# Patient Record
Sex: FEMALE | Race: WHITE
[De-identification: names, ages, dates, MRNs, and addresses within clinical notes are randomized per-mention and may not be internally consistent; named-entity substitution may affect disease eponyms.]

---

## 2019-07-26 ENCOUNTER — HOSPITAL ENCOUNTER (EMERGENCY)
Dept: HOSPITAL 88 - ER | Age: 49
LOS: 1 days | Discharge: HOME | End: 2019-07-27
Payer: COMMERCIAL

## 2019-07-26 VITALS — BODY MASS INDEX: 21.05 KG/M2 | HEIGHT: 66 IN | WEIGHT: 131 LBS

## 2019-07-26 DIAGNOSIS — F15.10: Primary | ICD-10-CM

## 2019-07-26 PROCEDURE — 85025 COMPLETE CBC W/AUTO DIFF WBC: CPT

## 2019-07-26 PROCEDURE — 80048 BASIC METABOLIC PNL TOTAL CA: CPT

## 2019-07-26 PROCEDURE — 36415 COLL VENOUS BLD VENIPUNCTURE: CPT

## 2019-07-26 PROCEDURE — 84484 ASSAY OF TROPONIN QUANT: CPT

## 2019-07-26 PROCEDURE — 82550 ASSAY OF CK (CPK): CPT

## 2019-07-26 PROCEDURE — 93005 ELECTROCARDIOGRAM TRACING: CPT

## 2019-07-26 PROCEDURE — 82553 CREATINE MB FRACTION: CPT

## 2019-07-26 PROCEDURE — 99283 EMERGENCY DEPT VISIT LOW MDM: CPT

## 2019-07-26 NOTE — XMS REPORT
Summary of Care: 18 - 18

                             Created on: 10/08/2100



MOLLY MCLAIN

External Reference #: [id number]

: 1970

Sex: Female



Demographics







                          Address                   [address] [place]  [address]

 

                          Home Phone                [phone]

 

                          Preferred Language        English

 

                          Marital Status            

 

                          Scientologist Affiliation     [employer]

 

                          Race                      White

 

                          Additional Race(s)        White



 

                          Ethnic Group              Non-





Author







                          Author                    [facility]

 

                          Organization              [facility]

 

                          Address                   Unknown

 

                          Phone                     Unavailable







Encounter





[facility] [id number] Date(s): 18 - 18

[facility] [address] [place], [place] [address] [phone]

Discharge Diagnosis: Sacrococcygeal disorders, not elsewhere classified

Discharge Disposition: Discharged to Home or Self Care

Attending Physician: Bennett Hernandez MD

Admitting Physician: Bennett Hernandez MD





Vital Signs







                    1                   2                   3



                                         Most recent to   



                                         oldest [Reference   



                                         Range]:   

 

                                        36.0 DegC 

*LOW*

                          (18 2:25 PM)         37.0 DegC 

                          (18 12:25 PM)         



                                         Temperature Temporal   



                                         Artery [36.3-37.8   



                                         DegC]   

 

                                        96.8 

                    (18 2:25 PM)                         



                                         Temperature Temporal   



                                         Fahrenheit   

 

                                        68 bpm 

                          (18 2:40 PM)         68 bpm 

                          (18 2:35 PM)         70 bpm 

                                        (18 2:30 PM)



                                         Peripheral Pulse   



                                         Rate [ bpm]   

 

                                        16 

                          (18 2:40 PM)         16 

                          (18 2:35 PM)         16 

                                        (18 2:30 PM)



                                         Respiratory Rate   



                                         [12-20]   

 

                                        98 % 

                          (18 2:40 PM)         98 % 

                          (18 2:35 PM)         98 % 

                                        (18 2:30 PM)



                                         SpO2 [ %]   

 

                                        105/58 mmHg 

*LOW*

                          (18 2:40 PM)         110/59 mmHg 

                          (18 2:35 PM)         93/55 mmHg 

*LOW*

                                        (18 2:30 PM)



                                         Blood Pressure   



                                         [110-120/65-85 mmHg]   

 

                                        73.7 mmHg 

                          (18 2:40 PM)         76 mmHg 

                          (18 2:35 PM)         67.7 mmHg 

                                        (18 2:30 PM)



                                         Mean Arterial   



                                         Pressure, Cuff   

 

                                        167 cm 

                    (18 12:25 PM)                         



                                         Height   

 

                                        167 cm 

                    (18 12:25 PM)                         



                                         Height/Length Dosing   

 

                                        66 in 

                    (18 12:25 PM)                         



                                         Height Inches   

 

                                        62.1 kg 

                    (18 12:25 PM)                         



                                         Weight   

 

                                        62.1 kg 

                    (18 12:25 PM)                         



                                         Weight Dosing   

 

                                        137 lb 

                    (18 12:25 PM)                         



                                         Weight Pounds   

 

                                        22.27 kg/m2 

                    (18 12:25 PM)                         



                                         Body Mass Index   







Problem List







    



              Condition     Effective Dates     Status       Health Status     Informant

 

    



                           Back pain(Confirmed)      Active  







Allergies, Adverse Reactions, Alerts





No Known Allergies



Medications





ceFAZolin

1 gm, Powder-Inj, IV, Once, first dose 18 14:09:00 CDT, stop date 18
14:09:00 CDT

Start Date: 18

Stop Date: 18

Status: Completed



ceFAZolin

1 gm, Soln-IV, IV Piggyback, Once, infuse over 30 minutes, first dose 18 1
3:00:00 CDT, stop date 18 13:00:00 CDT, Prophylaxis

Start Date: 18

Stop Date: 18

Status: Completed



gabapentin 300 mg oral capsule

mg caps, Oral, TID, 0 Refill(s)

Start Date: 18

Stop Date: 8/3/18

Status: Ordered



ibuprofen 800 mg oral tablet

800 mg=1 tabs, Oral, BID, # 90 tabs, 0 Refill(s)

Start Date: 12/21/15

Stop Date: 16

Status: Ordered



lidocaine

2 mL, Injection, IV, Once, first dose 18 14:08:00 CDT, stop date 18 
14:08:00 CDT

Start Date: 18

Stop Date: 18

Status: Completed



LR 1,000 mL

1,000 mL, IV, 100 mL/hr, start date 18 12:09:00 CDT, For Adults

Start Date: 18

Stop Date: 18

Status: Discontinued



 mL

500 mL, IV, 75 mL/hr, start date 18 12:09:00 CDT

Start Date: 18

Stop Date: 18

Status: Discontinued



Lyrica 75 mg oral capsule

75 mg=1 caps, Oral, BID, 0 Refill(s)

Start Date: 12/21/15

Stop Date: 18

Status: Discontinued



Misc Medication

300 mL, Soln-IV, IV, Once, first dose 18 14:21:00 CDT, stop date 18 
14:21:00 CDT

Start Date: 18

Stop Date: 18

Status: Completed



Norco 5 mg-325 mg oral tablet

tabs, Oral, q6hr, 0 Refill(s)

Start Date: 18

Stop Date: 8/3/18

Status: Ordered



Pain Ease topical spray

1 sprays, Spray, TOP, Once, first dose 18 13:00:00 CDT, stop date 18
13:00:00 CDT, Apply topically for 4  - 10 seconds from a distance of 3  - 7 inc
hes from the procedure site

Start Date: 18

Stop Date: 18

Status: Completed



propofol

50 mg=5 mL, Emulsion, IV, Once, first dose 18 14:17:00 CDT, stop date  14:17:00 CDT

Start Date: 18

Stop Date: 18

Status: Completed



propofol

50 mg=5 mL, Emulsion, IV, Once, first dose 18 14:13:00 CDT, stop date  14:13:00 CDT

Start Date: 18

Stop Date: 18

Status: Completed



propofol

50 mg=5 mL, Emulsion, IV, Once, first dose 18 14:09:00 CDT, stop date  14:09:00 CDT

Start Date: 18

Stop Date: 18

Status: Completed



Zipsor 25 mg oral capsule

25 mg=1 caps, Oral, QID, PRN for pain, # 40 caps, 0 Refill(s)

Start Date: 12/21/15

Stop Date: 16

Status: Ordered



Results





LABORATORY





 



                           Most recent to            1



                                         oldest [Reference 



                                         Range]: 

 

 



                           urine beta hcg            Negative, Control Present



                                         (18 12:48 PM)







Immunizations





No data available for this section



Procedures





No data available for this section



Social History







 



                           Social History Type       Response







Assessment and Plan





No data available for this section

## 2019-07-26 NOTE — XMS REPORT
Continuity of Care Document

                             Created on: 2019



MOLLY MCLAIN

External Reference #: [id number]

: 1970

Sex: Female



Demographics







                          Address                   [address] [place]  [address]

 

                          Home Phone                [phone]

 

                          Preferred Language        English

 

                          Marital Status            Unknown

 

                          Jew Affiliation     Unknown

 

                          Race                      Unknown

 

                          Ethnic Group              Unknown





Author







                          Author                    [facility]

 

                          Address                   Unknown

 

                          Phone                     Unavailable







Care Team Providers







                    Care Team Member Name    Role                Phone

 

                    [facility] [facility] [employer] [facility]    Unavailable         Unavailable



                                    



Problems

                    





                    Problem                            Status                            Onset Date     

                          Classification                            Date Reported       

                          Comments                            Source                    

 

                                        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

                                                        2018                                        

                                        [facility]                    

 

                                        Discharge Diagnosis: Sacrococcygeal disorders, not elsewhere classified         

                                                2018                                                 

                                        [facility]                    

 

                    Back pain                            Active                                         

                          Problem                            10/28/2018                         

                                                      [facility]                    





                                                                                
                       



Medications

                    





                    Medication                            Details                            Route      

                          Status                            Patient Instructions         

                          Ordering Provider                            Order Date           

                                        Source                    

 

                          Misc Medication                            300 mL, Soln-IV, IV, Once, first dose 10/26/18

 9:31:00 CDT, stop date 10/26/18 9:31:00 CDT                                       

                    Inactive                                                              

                          10/26/2018                            [facility]                    

 

                          propofol                            50 mg=5 mL, Emulsion, IV, Once, first dose 10/26/18

 9:27:00 CDT, stop date 10/26/18 9:27:00 CDT                                       

                    Inactive                                                              

                          10/26/2018                            [facility]                    

 

                          propofol                            50 mg=5 mL, Emulsion, IV, Once, first dose 10/26/18

 9:25:00 CDT, stop date 10/26/18 9:25:00 CDT                                       

                    Inactive                                                              

                          10/26/2018                            [facility]                    

 

                          propofol                            50 mg=5 mL, Emulsion, IV, Once, first dose 10/26/18

 9:22:00 CDT, stop date 10/26/18 9:22:00 CDT                                       

                    Inactive                                                              

                          10/26/2018                            [facility]                    

 

                          propofol                            50 mg=5 mL, Emulsion, IV, Once, first dose 10/26/18

 9:19:00 CDT, stop date 10/26/18 9:19:00 CDT                                       

                    Inactive                                                              

                          10/26/2018                            [facility]                    

 

                          ceFAZolin                            1 gm, Powder-Inj, IV, Once, first dose 10/26/18

 9:19:00 CDT, stop date 10/26/18 9:19:00 CDT                                       

                    Inactive                                                              

                          10/26/2018                            [facility]                    

 

                          lidocaine                            2 mL, Injection, IV, Once, first dose 10/26/18

 9:17:00 CDT, stop date 10/26/18 9:17:00 CDT                                       

                    Inactive                                                              

                          10/26/2018                            [facility]                    

 

                          Cefazolin                            1 gm, Soln-IV, IV Piggyback, Once, infuse over

 30 minutes, first dose 10/26/18 8:00:00 CDT, stop date 10/26/18 8:00:00 CDT, 
Prophylaxis                                                        Inactive          

                                                                            10/26/2018   

                                        [facility]                    

 

                          Pain Ease topical spray                            1 sprays, Spray, TOP, Once, first

 dose 10/26/18 8:00:00 CDT, stop date 10/26/18 8:00:00 CDT, Apply topically for 
4  - 10 seconds from a distance of 3  - 7 inches from the procedure site        
                                                Inactive                                

                                                      10/26/2018                       

                                        [facility]                    

 

                           mL                            500 mL, IV, 75 mL/hr, start date 10/26/18 7:57:00

 CDT                                                        Inactive                 

                                                                            10/26/2018          

                                        [facility]                    

 

                          Misc Medication                            200 mL, Soln-IV, IV, Once, first dose 18

 9:20:00 CDT, stop date 18 9:20:00 CDT                                       

                    Inactive                                                              

                          2018                            [facility]                    

 

                          propofol                            50 mg=5 mL, Emulsion, IV, Once, first dose 18

 9:14:00 CDT, stop date 18 9:14:00 CDT                                       

                    Inactive                                                              

                          2018                            [facility]                    

 

                          propofol                            50 mg=5 mL, Emulsion, IV, Once, first dose 18

 9:11:00 CDT, stop date 18 9:11:00 CDT                                       

                    Inactive                                                              

                          2018                            [facility]                    

 

                          propofol                            50 mg=5 mL, Emulsion, IV, Once, first dose 18

 9:08:00 CDT, stop date 18 9:08:00 CDT                                       

                    Inactive                                                              

                          2018                            [facility]                    

 

                          propofol                            50 mg=5 mL, Emulsion, IV, Once, first dose 18

 9:06:00 CDT, stop date 18 9:06:00 CDT                                       

                    Inactive                                                              

                          2018                            [facility]                    

 

                          ceFAZolin                            1 gm, Powder-Inj, IV, Once, first dose 18

 9:03:00 CDT, stop date 18 9:03:00 CDT                                       

                    Inactive                                                              

                          2018                            [facility]                    

 

                          propofol                            50 mg=5 mL, Emulsion, IV, Once, first dose 18

 9:03:00 CDT, stop date 18 9:03:00 CDT                                       

                    Inactive                                                              

                          2018                            [facility]                    

 

                          lidocaine                            2 mL, Injection, IV, Once, first dose 18

 9:02:00 CDT, stop date 18 9:02:00 CDT                                       

                    Inactive                                                              

                          2018                            [facility]                    

 

                          Cefazolin                            1 gm, Soln-IV, IV Piggyback, Once, infuse over

 30 minutes, first dose 18 9:00:00 CDT, stop date 18 9:00:00 CDT, 
Prophylaxis                                                        Inactive          

                                                                            2018   

                                        [facility]                    

 

                           mL                            500 mL, IV, 75 mL/hr, start date 18 8:02:00

 CDT                                                        Inactive                 

                                                                            2018          

                                        [facility]                    

 

                          Misc Medication                            300 mL, Soln-IV, IV, Once, first dose 18

 14:21:00 CDT, stop date 18 14:21:00 CDT                                     

                    Inactive                                                            

                          2018                            [facility]                   

 

 

                          propofol                            50 mg=5 mL, Emulsion, IV, Once, first dose 18

 14:17:00 CDT, stop date 18 14:17:00 CDT                                     

                    Inactive                                                            

                          2018                            [facility]                   

 

 

                          propofol                            50 mg=5 mL, Emulsion, IV, Once, first dose 18

 14:13:00 CDT, stop date 18 14:13:00 CDT                                     

                    Inactive                                                            

                          2018                            [facility]                   

 

 

                          propofol                            50 mg=5 mL, Emulsion, IV, Once, first dose 18

 14:09:00 CDT, stop date 18 14:09:00 CDT                                     

                    Inactive                                                            

                          2018                            [facility]                   

 

 

                          ceFAZolin                            1 gm, Powder-Inj, IV, Once, first dose 18

 14:09:00 CDT, stop date 18 14:09:00 CDT                                     

                    Inactive                                                            

                          2018                            [facility]                   

 

 

                          lidocaine                            2 mL, Injection, IV, Once, first dose 18

 14:08:00 CDT, stop date 18 14:08:00 CDT                                     

                    Inactive                                                            

                          2018                            [facility]                   

 

 

                          Pain Ease topical spray                            1 sprays, Spray, TOP, Once, first

 dose 18 13:00:00 CDT, stop date 18 13:00:00 CDT, Apply topically 
for 4  - 10 seconds from a distance of 3  - 7 inches from the procedure site    
                                                   Inactive                            

                                                        2018                   

                                        [facility]                    

 

                          Cefazolin                            1 gm, Soln-IV, IV Piggyback, Once, infuse over

 30 minutes, first dose 18 13:00:00 CDT, stop date 18 13:00:00 CDT, 
Prophylaxis                                                        Inactive          

                                                                            2018   

                                        [facility]                    

 

                                        Acetaminophen 325 MG / Hydrocodone Bitartrate 5 MG Oral Tablet [Norco 5/325]    

                          tabs, Oral, q6hr, 0 Refill(s)                                

                        Active                                                         

                           2018                            [facility]                

    

 

                          gabapentin 300 MG Oral Capsule                            mg caps, Oral, TID, 0 Refill(s)

                                                        Active                       

                                                                            2018                

                                        [facility]                    

 

                          LR 1,000 mL                            1,000 mL, IV, 100 mL/hr, start date 18

 12:09:00 CDT, For Adults                                                        Inactive

                                                                                    2018

                                        [facility]                    

 

                           mL                            500 mL, IV, 75 mL/hr, start date 18 12:09:00

 CDT                                                        Inactive                 

                                                                            2018          

                                        [facility]                    

 

                          Valium                            10 mg=2 mL, Injection, IV Push, Once, first dose

 12/22/15 13:00:00 CST, stop date 12/22/15 13:00:00 CST                            

                            Inactive                                                 

                    [place]ore[place]                            2015                            [facility] Med[facility]                            300 mL, Soln-IV, IV, Once, first dose 12/22/15

 11:31:00 CST, stop date 12/22/15 11:31:00 CST                                     

                    Inactive                                                        [facility]

                            2015                            [facility]                    

 

                          propofol                            50 mg=5 mL, Emulsion, IV, Once, first dose 12/22/15

 11:25:00 CST, stop date 12/22/15 11:25:00 CST                                     

                    Inactive                                                        [facility]

                            2015                            [facility]                    

 

                          propofol                            50 mg=5 mL, Emulsion, IV, Once, first dose 12/22/15

 11:23:00 CST, stop date 12/22/15 11:23:00 CST                                     

                    Inactive                                                        [facility]

                            2015                            [facility]                    

 

                          midazolam                            2 mg=2 mL, Injection, IV, Once, first dose 12/22/15

 11:23:00 CST, stop date 12/22/15 11:23:00 CST                                     

                    Inactive                                                        [facility]

                            2015                            [facility]                    

 

                          midazolam                            2 mg=2 mL, Injection, IV, Once, first dose 12/22/15

 11:19:00 CST, stop date 12/22/15 11:19:00 CST                                     

                    Inactive                                                        [facility]

                            2015                            [facility]                    

 

                          lidocaine                            2 mL, Injection, IV, Once, first dose 12/22/15

 11:19:00 CST, stop date 12/22/15 11:19:00 CST                                     

                    Inactive                                                        [facility]

                            2015                            [facility]                    

 

                          ethyl chloride topical spray                            1 sprays, Spray, TOP, Once,

 first dose 12/22/15 11:00:00 CST, stop date 12/22/15 11:00:00 CST              
                                                Inactive                                      

                          Lorena                            2015                       

                                        [facility]                    

 

                           mL                            500 mL, IV, 75 mL/hr, start date 12/22/15 10:41:00

 CST                                                        Inactive                 

                                                [place]na                            2015    

                                        [facility]                    

 

                          Ibuprofen 800 MG Oral Tablet                            800 mg=1 tabs, Oral, BID, 

# 90 tabs, 0 Refill(s)                                                        Active 

                                                                                   2015

                                        [facility]                    

 

                          ibuprofen 800 mg oral tablet                            800 mg=1 tabs, Oral, BID, 

# 90 tabs, 0 Refill(s)                                                        Active 

                                                                                   2015

                                        [facility]                    

 

                          Diclofenac Potassium 25 MG Oral Capsule [Zipsor]                            25 mg=1

 caps, Oral, QID, PRN for pain, # 40 caps, 0 Refill(s)                             

                           No Longer Active                                          

                                                2015                            [facility]   

                 

 

                          Zipsor 25 mg oral capsule                            25 mg=1 caps, Oral, QID, PRN 

for pain, # 40 caps, 0 Refill(s)                                                   

                    Active                                                                            

                          2015                            [facility]          

          

 

                          pregabalin 75 MG Oral Capsule [Lyrica]                            75 mg=1 caps, Oral,

 BID, 0 Refill(s)                                                        No Longer Active

                                                                                    2015

                                        [facility]                    

 

                          Lyrica 75 mg oral capsule                            75 mg=1 caps, Oral, BID, 0 Refill(s)

                                                        Active                       

                                                                            2015                

                                        [facility]                    



                                                                                
                                                                                
                                                                                
                                                                                
                                                                                
                                                                                
                                                                                
                                                                                
                                                                                
                                                                                
                    



Allergies, Adverse Reactions, Alerts

        





                                        No Known Medication Allergies                      



                                                        



Immunizations

        





                                        No Data Provided for This Section



                                    



Results







                    Order Name                            Results                            Value      

                          Reference Range                            Date                

                          Interpretation                            Comments                       

                                        Source                    

 

                    LABORATORY                            urine beta hcg      Negative, Control Present 

                    (18 8:36 AM)                                                        2018 

                                                                                   [facility]

                    

 

                    LABORATORY                            urine beta hcg      Negative, Control Present 

                    (18 12:48 PM)                                                        2018

                                                                                    [facility]

                    



                              



Pathology Reports







                                        No Data Provided for This Section                    



                            



Diagnostic Reports

            





                                        No Data Provided for This Section                    



                                                            



Consultation Notes

                    





                                        No Data Provided for This Section                    



                                                            



Discharge Summaries

                    





                                        No Data Provided for This Section                    



                                                            



History and Physicals

                    





                                        No Data Provided for This Section                    



                                                                



Vital Signs

                     





                    Vital Sign                            Value                            Date         

                          Comments                            Source                    

 

                    Systolic (mm Hg)                            100                             10/26/2018

                                                        [facility]                    

 

                    Diastolic (mm Hg)                            56                             10/26/2018

                                                        [facility]                    

 

                    Respitory Rate                            16                             10/26/2018 

                                                       [facility]                    

 

                    Heart Rate                            81                             10/26/2018     

                                                      [facility]                    

 

                    Temperature Oral (F)                            36.1 Luda                            

10/26/2018                                                        [facility]               

     

 

                    Heart Rate                            69                             10/26/2018     

                                                      [facility]                    

 

                    Systolic (mm Hg)                            92                             10/26/2018

                                                        [facility]                    

 

                    Diastolic (mm Hg)                            52                             10/26/2018

                                                        [facility]                    

 

                    Respitory Rate                            16                             10/26/2018 

                                                       [facility]                    

 

                    Systolic (mm Hg)                            105                             10/26/2018

                                                        [facility]                    

 

                    Diastolic (mm Hg)                            64                             10/26/2018

                                                        [facility]                    

 

                    Height                            167 cm                            10/26/2018      

                                                      [facility]                    

 

                    Weight Measured                            60                             10/26/2018

                                                        [facility]                    

 

                    Respitory Rate                            16                             10/26/2018 

                                                       [facility]                    

 

                    Peripheral Pulse Rate                            69                             10/26/2018

                                                        [facility]                    

 

                    Temperature Oral (F)                            36.5 Luda                            

10/26/2018                                                        [facility]               

     

 

                    Systolic (mm Hg)                            111                             2018

                                                        [facility]                    

 

                    Diastolic (mm Hg)                            53                             2018

                                                        [facility]                    

 

                    Peripheral Pulse Rate                            76                             2018

                                                        [facility]                    

 

                    Respitory Rate                            16                             2018 

                                                       [facility]                    

 

                    Systolic (mm Hg)                            105                             2018

                                                        [facility]                    

 

                    Diastolic (mm Hg)                            67                             2018

                                                        [facility]                    

 

                    Peripheral Pulse Rate                            73                             2018

                                                        [facility]                    

 

                    Respitory Rate                            16                             2018 

                                                       [facility]                    

 

                    Systolic (mm Hg)                            97                             2018

                                                        [facility]                    

 

                    Diastolic (mm Hg)                            58                             2018

                                                        [facility]                    

 

                    Respitory Rate                            12                             2018 

                                                       [facility]                    

 

                    Peripheral Pulse Rate                            73                             2018

                                                        [facility]                    

 

                    Temperature Oral (F)                            36.2 Luda                            

2018                                                        [facility]               

     

 

                    Height                            167 cm                            2018      

                                                      [facility]                    

 

                    Weight Measured                            59                             2018

                                                        [facility]                    

 

                    Temperature Oral (F)                            36.3 Luda                            

2018                                                        [facility]               

     

 

                    Weight Measured                            59                             2018

                                                        [facility]                    

 

                    Height                            167 cm                            2018      

                                                      [facility]                    

 

                    Respitory Rate                            16                             2018 

                                                       [facility]                    

 

                    Peripheral Pulse Rate                            68                             2018

                                                        [facility]                    

 

                    Systolic (mm Hg)                            105                             2018

                                                        [facility]                    

 

                    Diastolic (mm Hg)                            58                             2018

                                                        [facility]                    

 

                    Systolic (mm Hg)                            110                             2018

                                                        [facility]                    

 

                    Diastolic (mm Hg)                            59                             2018

                                                        [facility]                    

 

                    Peripheral Pulse Rate                            68                             2018

                                                        [facility]                    

 

                    Respitory Rate                            16                             2018 

                                                       [facility]                    

 

                    Systolic (mm Hg)                            93                             2018

                                                        [facility]                    

 

                    Diastolic (mm Hg)                            55                             2018

                                                        [facility]                    

 

                    Respitory Rate                            16                             2018 

                                                       [facility]                    

 

                    Peripheral Pulse Rate                            70                             2018

                                                        [facility]                    

 

                    Temperature Oral (F)                            36.0 Luda                            

2018                                                        [facility]               

     

 

                    Temperature Oral (F)                            37.0 Luda                            

2018                                                        [facility]               

     

 

                    Weight Measured                            62.1                             2018

                                                        [facility]                    

 

                    Height                            167 cm                            2018      

                                                      [facility]                    

 

                    Heart Rate                            63                             2015     

                                                       [facility] [place]     

               

 

                    Respitory Rate                            14                             2015 

                                                       [facility] [place] 

                   

 

                    Systolic (mm Hg)                            114/68                             2015

                                                        [facility] [place]

                    

 

                    Systolic (mm Hg)                            93/64                             2015

                                                        [facility]

                    

 

                    Respitory Rate                            15                             2015 

                                                       [facility] 

                   

 

                    Heart Rate                            63                             2015     

                                                      [facility] [place]     

               

 

                    Systolic (mm Hg)                            104/67                             2015

                                                        [facility] [place]

                    

 

                    Heart Rate                            64                             2015     

                                                      [facility] [place]     

               

 

                    Respitory Rate                            15                             2015 

                                                        [facility] [place] 

                   

 

                    Temperature Oral (F)                            36.5 Luda                            

2015                                                         [facility]

 [place]                    

 

                    Weight                            62.14                             2015      

                                                      [facility] [place]      

              

 

                    Peripheral Pulse Rate                            66                             2015

                                                        [facility] [place]

                    

 

                    Temperature Oral (F)                            36.5 Luda                            

2015                                                         [facility]

 [place]                    

 

                    Weight                            62                             2015         

                                                      [facility] [place]         

           

 

                    Height                            167.64 cm                            2015   

                                                      [facility] [place]   

                 

 

                    Weight                            22.06                             2015      

                                                      [facility]      

              



                                                                                
                                                                                
                                                                                
                                                                                
                                                                                
                                                                                
                                                                                
                                                                                
                                                                                
                                                                                
                                                                                
                        



Encounters

                    





                    Location                            Location Details                            Encounter

 Type                            Encounter Number                            Reason For

 Visit                            Attending Provider                            ADM Date

                            DC Date                            Status                

                                        Source                    

 

                    [facility]                            Outpatient          

                    [id number]                                                        Hi Carrillo MD                            2015

                            Discharged                            [facility]                            Outpatient          

                    [id number]                                                        Bennett David

                             2018        

                          Discharged                            [facility]                                       

                          Outpatient                            [id number]                         

                                                Bennett David                             2018                                                     

                                        [facility]                    

 

                    [facility]                            Outpatient          

                    [id number]                                                        Bennett David

                             2018        

                          Discharged                            [facility]                                       

                          Outpatient                            [id number]                         

                                                Bennett David                             2018                                                     

                                        [facility]                    

 

                    [facility]                            Outpatient          

                    [id number]                                                        Bennett David

                             10/26/2018                            10/26/2018        

                          Active                            [facility]                                       

                          Outpatient                            [id number]                         

                                                Bennett David                             10/26/2018      

                          10/26/2018                                                     

                                        [facility]                    



                                                                                
                                                                



Procedures

                    





                    Procedure                            Code                            Date           

                          Perfomer                            Comments                        

                                        Source                    

 

                    breast implants                                                                     

                                                                            [facility]                    

 

                    knee surgery                                                                        

                                                                            [facility]                    

 

                    JEROME                                                                                 

                                                                            [facility]                    

 

                    BLADDER SLING                                                                       

                                                                            [facility]              

      

 

                    HYESTERECTOMY                                                                       

                                                                            [facility]              

      



                                                                                
                                            



Assessment and Plan

                    





                                        No Data Provided for This Section                    



                                     



Plan of Care







                                        No Data Provided for This Section                    



                                                                



Social History

                    





                    Social History                            Date                            Source    

                

 

                                        Social History [facility]

                                                        [facility]                    



                                                                    



Family History

                    





                                        No Data Provided for This Section                    



                                                            



Advance Directives

                    





                                        No Data Provided for This Section                    



                                                            



Functional Status

                    





                                        No Data Provided for This Section

## 2019-07-26 NOTE — XMS REPORT
Encounter CCD: 2015 to 2015

                             Created on: 08/10/2105



MOLLY MCLAIN

External Reference #: [id number]

: 1970

Sex: Female



Demographics







                          Address                   [address] [place]  [address]

 

                          Home Phone                [phone]

 

                          Preferred Language        Unknown

 

                          Marital Status            

 

                          Confucianism Affiliation     Unknown

 

                          Race                      White

 

                          Ethnic Group              Non-





Author







                          Author                    Auto Generated

 

                          Organization              [facility]

 

                          Address                   Unknown

 

                          Phone                     Unavailable







Care Team Providers







                    Care Team Member Name    Role                Phone

 

                    Lorena BUNCH, Hi Doyle CP                  Unavailable







Allergies, Adverse Reactions, Alerts







  



                     Substance           Reaction            Status

 

  



                           No Known Allergies        Active







Problem List







  



                     Condition           Effective Dates     Status

 

  



                           Back pain                 Active







Medications







    



              Medication     Instructions     Start Date     End Date     Status

 

    



              ibuprofen 800 mg     800 mg=1 tabs, Oral, BID, # 90     2015     Ordered





                           oral tablet               tabs, 0 Refill(s)   

 

    



              Valium       10 mg=2 mL, Injection, IV Push,     2015     Ordered



                                         Once, first dose 12/22/15 13:00:00   



                                         CST, stop date 12/22/15 13:00:00   



                                         CST   

 

    



              ethyl chloride     1 sprays, Spray, TOP, Once, first     2015     

Completed



                           topical spray             dose 12/22/15 11:00:00 CST, stop   



                                         date 12/22/15 11:00:00 CST   

 

    



               mL     500 mL, IV, 75 mL/hr, start date     2015     Discontinued





                                         12/22/15 10:41:00 CST   

 

    



                 Zipsor 25 mg oral     25 mg=1 caps, Oral, QID, PRN for     2015 

                                         Ordered



                           capsule                   pain, # 40 caps, 0 Refill(s)   

 

    



              Lyrica 75 mg oral     75 mg=1 caps, Oral, BID, 0     2015     Ordered





                           capsule                   Refill(s)   

 

    



              propofol     50 mg=5 mL, Emulsion, IV, Once,     2015     Completed





                                         first dose 12/22/15 11:23:00 CST,   



                                         stop date 12/22/15 11:23:00 CST   

 

    



              propofol     50 mg=5 mL, Emulsion, IV, Once,     2015     Completed





                                         first dose 12/22/15 11:25:00 CST,   



                                         stop date 12/22/15 11:25:00 CST   

 

    



              midazolam     2 mg=2 mL, Injection, IV, Once,     2015     Completed





                                         first dose 12/22/15 11:23:00 CST,   



                                         stop date 12/22/15 11:23:00 CST   

 

    



              midazolam     2 mg=2 mL, Injection, IV, Once,     2015     Completed





                                         first dose 12/22/15 11:19:00 CST,   



                                         stop date 12/22/15 11:19:00 CST   

 

    



              lidocaine     2 mL, Injection, IV, Once, first     2015     Completed





                                         dose 12/22/15 11:19:00 CST, stop   



                                         date 12/22/15 11:19:00 CST   

 

    



              Misc Medication     300 mL, Soln-IV, IV, Once, first     2015     

Completed



                                         dose 12/22/15 11:31:00 CST, stop   



                                         date 12/22/15 11:31:00 CST   







Vital Signs







                Most recent to oldest [Reference Range]:    1               2               3

 

                          Temperature Tympanic [36.6-38.1 DegC]    36.5 DegC 

*LOW*

                    (2015 11:35:00)                           

 

                          Temperature Temporal Artery [36.3-37.8 DegC]    36.5 DegC 

                    (2015 10:42:00)                           

 

                          Temperature Tympanic Fahrenheit    97.7 

                    (2015 11:35:00)                           

 

                          Peripheral Pulse Rate [ bpm]    66 bpm 

                    (2015 10:42:00)                           

 

                          Heart Rate Monitored [ bpm]    63 bpm 

                          (2015 13:20:00)      63 bpm 

                          (2015 11:50:00)      64 bpm 

                                        (2015 11:45:00)  

 

                          Respiratory Rate [12-20]    14 

                          (2015 13:20:00)      15 

                          (2015 11:50:00)      15 

                                        (2015 11:45:00)  

 

                          SpO2 [ %]           98 % 

                          (2015 13:20:00)      99 % 

                          (2015 10:42:00)       

 

                          Blood Pressure [110-120/65-85 mmHg]    <content ID='[id number]'>114</content>/<content

 ID='[id number]'>68</content> mmHg 

                          (2015 13:20:00)      <content ID='[id number]'>93</content>/<content ID='[id number]'>64</content>

 mmHg 

*LOW*

                          (2015 11:50:00)      <content ID='[id number]'>104</content>/<content ID='[id number]'>67</content>

 mmHg 

*LOW*

                                        (2015 11:45:00)  

 

                          Mean Arterial Pressure, Cuff    83.3 mmHg 

                          (2015 13:20:00)      73.7 mmHg 

                          (2015 11:50:00)      79.3 mmHg 

                                        (2015 11:45:00)  









                Most recent to oldest [Reference Range]:    1               2               3

 

                          Height                    167.64 cm 

                    (2015 14:23:00)                           

 

                          Height/Length Estimated    167.64 cm 

                    (2015 14:23:00)                           

 

                          Height/Length Dosing      167.64 cm 

                    (2015 14:23:00)                           

 

                          Height Inches             66 in 

                    (2015 14:23:00)                           

 

                          Weight                    62.14 kg 

                          (2015 10:42:00)      62 kg 

                          (2015 14:23:00)       

 

                          Weight Estimated          62 kg 

                    (2015 14:23:00)                           

 

                          Weight Dosing             62.14 kg 

                          (2015 10:42:00)      62 kg 

                          (2015 14:23:00)       

 

                          Weight Pounds             137 lb 

                          (2015 10:42:00)      136 lb 

                          (2015 14:23:00)       

 

                          Body Mass Index           22.06 kg/m2 

                    (2015 14:23:00)                           

 

                          Body Mass Index Estimated    22.06 kg/m2 

                    (2015 14:23:00)                           







Procedures







  



                     Procedures          Date                Related Diagnosis

 

  



                                         breast implants  

 

  



                                         knee surgery

## 2019-07-26 NOTE — XMS REPORT
Summary of Care: 18 - 18

                             Created on: 2049



MOLLY MCLAIN

External Reference #: [id number]

: 1970

Sex: Female



Demographics







                          Address                   [address] [place]  [address]

 

                          Home Phone                [phone]

 

                          Preferred Language        English

 

                          Marital Status            

 

                          Anglican Affiliation     Faith

 

                          Race                      White

 

                          Additional Race(s)        White



 

                          Ethnic Group              Non-





Author







                          Author                    [facility]

 

                          Address                   Unknown

 

                          Phone                     Unavailable







Encounter





[facility] [id number] Date(s): 18 - 18

[facility] [address] [place], [place] [address] [phone]

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
ion

Discharge Disposition: Discharged to Home or Self Care

Attending Physician: Bennett Hernandez MD

Admitting Physician: Bennett Hernandez MD





Vital Signs







                    1                   2                   3



                                         Most recent to   



                                         oldest [Reference   



                                         Range]:   

 

                                        36.2 DegC 

*LOW*

                          (18 9:20 AM)         36.3 DegC 

                          (18 8:20 AM)          



                                         Temperature Temporal   



                                         Artery [36.3-37.8   



                                         DegC]   

 

                                        97.16 

                    (18 9:20 AM)                         



                                         Temperature Temporal   



                                         Fahrenheit   

 

                                        76 bpm 

                          (18 9:35 AM)         73 bpm 

                          (18 9:30 AM)         73 bpm 

                                        (18 9:25 AM)



                                         Peripheral Pulse   



                                         Rate [ bpm]   

 

                                        16 

                          (18 9:35 AM)         16 

                          (18 9:30 AM)         12 

                                        (18 9:25 AM)



                                         Respiratory Rate   



                                         [12-20]   

 

                                        97 % 

                          (18 9:35 AM)         97 % 

                          (18 9:30 AM)         99 % 

                                        (18 9:25 AM)



                                         SpO2 [ %]   

 

                                        111/53 mmHg 

                          (18 9:35 AM)         105/67 mmHg 

*LOW*

                          (18 9:30 AM)         97/58 mmHg 

*LOW*

                                        (18 9:25 AM)



                                         Blood Pressure   



                                         [110-120/65-85 mmHg]   

 

                                        72.3 mmHg 

                          (18 9:35 AM)         79.7 mmHg 

                          (18 9:30 AM)         71 mmHg 

                                        (18 9:25 AM)



                                         Mean Arterial   



                                         Pressure, Cuff   

 

                                        167 cm 

                          (18 8:20 AM)         167 cm 

                          (18 4:08 PM)          



                                         Height   

 

                                        167.64 cm 

                    (18 4:08 PM)                         



                                         Height/Length   



                                         Estimated   

 

                                        167 cm 

                          (18 8:20 AM)         167 cm 

                          (18 4:08 PM)          



                                         Height/Length Dosing   

 

                                        66 in 

                          (18 8:20 AM)         66 in 

                          (18 4:08 PM)          



                                         Height Inches   

 

                                        59 kg 

                          (18 8:20 AM)         59 kg 

                          (18 4:08 PM)          



                                         Weight   

 

                                        62 kg 

                    (18 4:08 PM)                         



                                         Weight Estimated   

 

                                        59 kg 

                          (18 8:20 AM)         59 kg 

                          (18 4:08 PM)          



                                         Weight Dosing   

 

                                        130 lb 

                          (18 8:20 AM)         130 lb 

                          (18 4:08 PM)          



                                         Weight Pounds   

 

                                        21.16 kg/m2 

                          (18 8:20 AM)         21.16 kg/m2 

                          (18 4:08 PM)          



                                         Body Mass Index   

 

                                        22.06 kg/m2 

                    (18 4:08 PM)                         



                                         Body Mass Index   



                                         Estimated   







Problem List







    



              Condition     Effective Dates     Status       Health Status     Informant

 

    



                           Back pain(Confirmed)      Active  







Allergies, Adverse Reactions, Alerts





No Known Allergies



Medications





ceFAZolin

1 gm, Soln-IV, IV Piggyback, Once, infuse over 30 minutes, first dose 18 9
:00:00 CDT, stop date 18 9:00:00 CDT, Prophylaxis

Start Date: 18

Stop Date: 18

Status: Completed



ceFAZolin

1 gm, Powder-Inj, IV, Once, first dose 18 9:03:00 CDT, stop date 18 
9:03:00 CDT

Start Date: 18

Stop Date: 18

Status: Completed



gabapentin 300 mg oral capsule

mg caps, Oral, TID, 0 Refill(s)

Start Date: 18

Stop Date: 8/3/18

Status: Ordered



ibuprofen 800 mg oral tablet

800 mg=1 tabs, Oral, BID, # 90 tabs, 0 Refill(s)

Start Date: 12/21/15

Stop Date: 16

Status: Ordered



lidocaine

2 mL, Injection, IV, Once, first dose 18 9:02:00 CDT, stop date 18 9
:02:00 CDT

Start Date: 18

Stop Date: 18

Status: Completed



 mL

500 mL, IV, 75 mL/hr, start date 18 8:02:00 CDT

Start Date: 18

Stop Date: 18

Status: Discontinued



Lyrica 75 mg oral capsule

75 mg=1 caps, Oral, BID, 0 Refill(s)

Start Date: 12/21/15

Stop Date: 18

Status: Discontinued



Misc Medication

200 mL, Soln-IV, IV, Once, first dose 18 9:20:00 CDT, stop date 18 9
:20:00 CDT

Start Date: 18

Stop Date: 18

Status: Completed



Norco 5 mg-325 mg oral tablet

tabs, Oral, q6hr, 0 Refill(s)

Start Date: 18

Stop Date: 8/3/18

Status: Ordered



propofol

50 mg=5 mL, Emulsion, IV, Once, first dose 18 9:06:00 CDT, stop date  9:06:00 CDT

Start Date: 18

Stop Date: 18

Status: Completed



propofol

50 mg=5 mL, Emulsion, IV, Once, first dose 18 9:08:00 CDT, stop date  9:08:00 CDT

Start Date: 18

Stop Date: 18

Status: Completed



propofol

50 mg=5 mL, Emulsion, IV, Once, first dose 18 9:14:00 CDT, stop date  9:14:00 CDT

Start Date: 18

Stop Date: 18

Status: Completed



propofol

50 mg=5 mL, Emulsion, IV, Once, first dose 18 9:11:00 CDT, stop date  9:11:00 CDT

Start Date: 18

Stop Date: 18

Status: Completed



propofol

50 mg=5 mL, Emulsion, IV, Once, first dose 18 9:03:00 CDT, stop date  9:03:00 CDT

Start Date: 18

Stop Date: 18

Status: Completed



Zipsor 25 mg oral capsule

25 mg=1 caps, Oral, QID, PRN for pain, # 40 caps, 0 Refill(s)

Start Date: 12/21/15

Stop Date: 18

Status: Discontinued



Results





LABORATORY





 



                           Most recent to            1



                                         oldest [Reference 



                                         Range]: 

 

 



                           urine beta hcg            Negative, Control Present



                                         (18 8:36 AM)







Immunizations





No data available for this section



Procedures







   



                 Procedure       Date            Related Diagnosis     Body Site

 

   



                                         JEROME   







Social History







 



                           Social History Type       Response







Assessment and Plan





No data available for this section

## 2019-07-26 NOTE — XMS REPORT
Summary of Care: 10/26/18 - 10/26/18

                             Created on: 2066



MOLLY MCLAIN

External Reference #: [id number]

: 1970

Sex: Female



Demographics







                          Address                   [address] [place]  [address]

 

                          Home Phone                [phone]

 

                          Preferred Language        English

 

                          Marital Status            

 

                          Adventism Affiliation     Quaker

 

                          Race                      White

 

                          Additional Race(s)        White



 

                          Ethnic Group              Non-





Author







                          Author                    [facility]

 

                          Address                   Unknown

 

                          Phone                     Unavailable







Encounter





[facility] [id number] Date(s): 10/26/18 - 10/26/18

[facility] [address] [place] [address]-      [phone]

Discharge Disposition: Discharged to Home or Self Care

Attending Physician: Bennett Hernandez MD

Admitting Physician: Bennett Hernandez MD





Vital Signs







                    1                   2                   3



                                         Most recent to   



                                         oldest [Reference   



                                         Range]:   

 

                                        36.1 DegC 

*LOW*

                          (10/26/18 9:37 AM)        36.5 DegC 

                          (10/26/18 8:04 AM)         



                                         Temperature Temporal   



                                         Artery [36.3-37.8   



                                         DegC]   

 

                                        96.98 

                    (10/26/18 9:37 AM)                         



                                         Temperature Temporal   



                                         Fahrenheit   

 

                                        69 bpm 

                    (10/26/18 8:04 AM)                         



                                         Peripheral Pulse   



                                         Rate [ bpm]   

 

                                        81 bpm 

                          (10/26/18 9:45 AM)        69 bpm 

                          (10/26/18 9:37 AM)         



                                         Heart Rate Monitored   



                                         [ bpm]   

 

                                        16 

                          (10/26/18 9:45 AM)        16 

                          (10/26/18 9:37 AM)        16 

                                        (10/26/18 8:04 AM)



                                         Respiratory Rate   



                                         [12-20]   

 

                                        94 % 

                          (10/26/18 9:45 AM)        96 % 

                          (10/26/18 9:37 AM)        99 % 

                                        (10/26/18 8:04 AM)



                                         SpO2 [ %]   

 

                                        100/56 mmHg 

*LOW*

                          (10/26/18 9:45 AM)        92/52 mmHg 

*LOW*

                          (10/26/18 9:37 AM)        105/64 mmHg 

*LOW*

                                        (10/26/18 8:04 AM)



                                         Blood Pressure   



                                         [110-120/65-85 mmHg]   

 

                                        167 cm 

                    (10/26/18 8:04 AM)                         



                                         Height   

 

                                        167 cm 

                    (10/26/18 8:04 AM)                         



                                         Height/Length Dosing   

 

                                        66 in 

                    (10/26/18 8:04 AM)                         



                                         Height Inches   

 

                                        60 kg 

                    (10/26/18 8:04 AM)                         



                                         Weight   

 

                                        60 kg 

                    (10/26/18 8:04 AM)                         



                                         Weight Dosing   

 

                                        132 lb 

                    (10/26/18 8:04 AM)                         



                                         Weight Pounds   

 

                                        21.51 kg/m2 

                    (10/26/18 8:04 AM)                         



                                         Body Mass Index   







Problem List







    



              Condition     Effective Dates     Status       Health Status     Informant

 

    



                           Back pain(Confirmed)      Active  







Allergies, Adverse Reactions, Alerts





No Known Allergies



Medications





ceFAZolin

1 gm, Soln-IV, IV Piggyback, Once, infuse over 30 minutes, first dose 10/26/18 8
:00:00 CDT, stop date 10/26/18 8:00:00 CDT, Prophylaxis

Start Date: 10/26/18

Stop Date: 10/26/18

Status: Completed



ceFAZolin

1 gm, Powder-Inj, IV, Once, first dose 10/26/18 9:19:00 CDT, stop date 10/26/18 
9:19:00 CDT

Start Date: 10/26/18

Stop Date: 10/26/18

Status: Completed



gabapentin 300 mg oral capsule

mg caps, Oral, TID, 0 Refill(s)

Start Date: 18

Stop Date: 8/3/18

Status: Ordered



ibuprofen 800 mg oral tablet

800 mg=1 tabs, Oral, BID, # 90 tabs, 0 Refill(s)

Start Date: 12/21/15

Stop Date: 16

Status: Ordered



lidocaine

2 mL, Injection, IV, Once, first dose 10/26/18 9:17:00 CDT, stop date 10/26/18 9
:17:00 CDT

Start Date: 10/26/18

Stop Date: 10/26/18

Status: Completed



 mL

500 mL, IV, 75 mL/hr, start date 10/26/18 7:57:00 CDT

Start Date: 10/26/18

Stop Date: 10/26/18

Status: Discontinued



Lyrica 75 mg oral capsule

75 mg=1 caps, Oral, BID, 0 Refill(s)

Start Date: 12/21/15

Stop Date: 18

Status: Discontinued



Misc Medication

300 mL, Soln-IV, IV, Once, first dose 10/26/18 9:31:00 CDT, stop date 10/26/18 9
:31:00 CDT

Start Date: 10/26/18

Stop Date: 10/26/18

Status: Completed



Norco 5 mg-325 mg oral tablet

tabs, Oral, q6hr, 0 Refill(s)

Start Date: 18

Stop Date: 8/3/18

Status: Ordered



Pain Ease topical spray

1 sprays, Spray, TOP, Once, first dose 10/26/18 8:00:00 CDT, stop date 10/26/18 
8:00:00 CDT, Apply topically for 4  - 10 seconds from a distance of 3  - 7 inche
s from the procedure site

Start Date: 10/26/18

Stop Date: 10/26/18

Status: Completed



propofol

50 mg=5 mL, Emulsion, IV, Once, first dose 10/26/18 9:27:00 CDT, stop date 10/26
/18 9:27:00 CDT

Start Date: 10/26/18

Stop Date: 10/26/18

Status: Completed



propofol

50 mg=5 mL, Emulsion, IV, Once, first dose 10/26/18 9:19:00 CDT, stop date 10/26
/18 9:19:00 CDT

Start Date: 10/26/18

Stop Date: 10/26/18

Status: Completed



propofol

50 mg=5 mL, Emulsion, IV, Once, first dose 10/26/18 9:22:00 CDT, stop date 10/26
/18 9:22:00 CDT

Start Date: 10/26/18

Stop Date: 10/26/18

Status: Completed



propofol

50 mg=5 mL, Emulsion, IV, Once, first dose 10/26/18 9:25:00 CDT, stop date 10/26
/18 9:25:00 CDT

Start Date: 10/26/18

Stop Date: 10/26/18

Status: Completed



Zipsor 25 mg oral capsule

25 mg=1 caps, Oral, QID, PRN for pain, # 40 caps, 0 Refill(s)

Start Date: 12/21/15

Stop Date: 18

Status: Discontinued



Results





No data available for this section



Immunizations





No data available for this section



Procedures







   



                 Procedure       Date            Related Diagnosis     Body Site

 

   



                                         BLADDER SLING   

 

   



                                         HYESTERECTOMY   







Social History







 



                           Social History Type       Response







Assessment and Plan





No data available for this section

## 2019-07-27 VITALS — DIASTOLIC BLOOD PRESSURE: 71 MMHG | SYSTOLIC BLOOD PRESSURE: 111 MMHG

## 2019-07-27 LAB
ANION GAP SERPL CALC-SCNC: 17.8 MMOL/L (ref 8–16)
BASOPHILS # BLD AUTO: 0 10*3/UL (ref 0–0.1)
BASOPHILS NFR BLD AUTO: 0.4 % (ref 0–1)
BUN SERPL-MCNC: 12 MG/DL (ref 7–26)
BUN/CREAT SERPL: 16 (ref 6–25)
CALCIUM SERPL-MCNC: 10.1 MG/DL (ref 8.4–10.2)
CHLORIDE SERPL-SCNC: 97 MMOL/L (ref 98–107)
CK MB SERPL-MCNC: 5.2 NG/ML (ref 0–5)
CK SERPL-CCNC: 240 IU/L (ref 29–168)
CO2 SERPL-SCNC: 23 MMOL/L (ref 22–29)
DEPRECATED NEUTROPHILS # BLD AUTO: 6.1 10*3/UL (ref 2.1–6.9)
EGFRCR SERPLBLD CKD-EPI 2021: > 60 ML/MIN (ref 60–?)
EOSINOPHIL # BLD AUTO: 0.2 10*3/UL (ref 0–0.4)
EOSINOPHIL NFR BLD AUTO: 1.7 % (ref 0–6)
ERYTHROCYTE [DISTWIDTH] IN CORD BLOOD: 12.7 % (ref 11.7–14.4)
GLUCOSE SERPLBLD-MCNC: 128 MG/DL (ref 74–118)
HCT VFR BLD AUTO: 42.2 % (ref 34.2–44.1)
HGB BLD-MCNC: 14.8 G/DL (ref 12–16)
LYMPHOCYTES # BLD: 1.7 10*3/UL (ref 1–3.2)
LYMPHOCYTES NFR BLD AUTO: 18.6 % (ref 18–39.1)
MCH RBC QN AUTO: 31.2 PG (ref 28–32)
MCHC RBC AUTO-ENTMCNC: 35.1 G/DL (ref 31–35)
MCV RBC AUTO: 89 FL (ref 81–99)
MONOCYTES # BLD AUTO: 0.9 10*3/UL (ref 0.2–0.8)
MONOCYTES NFR BLD AUTO: 10.5 % (ref 4.4–11.3)
NEUTS SEG NFR BLD AUTO: 68.5 % (ref 38.7–80)
PLATELET # BLD AUTO: 268 X10E3/UL (ref 140–360)
POTASSIUM SERPL-SCNC: 3.8 MMOL/L (ref 3.5–5.1)
RBC # BLD AUTO: 4.74 X10E6/UL (ref 3.6–5.1)
SODIUM SERPL-SCNC: 134 MMOL/L (ref 136–145)